# Patient Record
Sex: MALE | Race: WHITE | NOT HISPANIC OR LATINO | URBAN - METROPOLITAN AREA
[De-identification: names, ages, dates, MRNs, and addresses within clinical notes are randomized per-mention and may not be internally consistent; named-entity substitution may affect disease eponyms.]

---

## 2020-11-11 ENCOUNTER — OUTPATIENT (OUTPATIENT)
Dept: OUTPATIENT SERVICES | Facility: HOSPITAL | Age: 65
LOS: 1 days | End: 2020-11-11

## 2023-08-02 PROBLEM — Z00.00 ENCOUNTER FOR PREVENTIVE HEALTH EXAMINATION: Status: ACTIVE | Noted: 2023-08-02

## 2023-08-03 ENCOUNTER — APPOINTMENT (OUTPATIENT)
Dept: INFECTIOUS DISEASE | Facility: CLINIC | Age: 68
End: 2023-08-03
Payer: MEDICARE

## 2023-08-03 VITALS
DIASTOLIC BLOOD PRESSURE: 82 MMHG | OXYGEN SATURATION: 93 % | HEIGHT: 71 IN | SYSTOLIC BLOOD PRESSURE: 160 MMHG | WEIGHT: 226 LBS | BODY MASS INDEX: 31.64 KG/M2 | TEMPERATURE: 97.9 F | HEART RATE: 47 BPM

## 2023-08-03 DIAGNOSIS — Z87.19 PERSONAL HISTORY OF OTHER DISEASES OF THE DIGESTIVE SYSTEM: ICD-10-CM

## 2023-08-03 DIAGNOSIS — Z77.22 CONTACT WITH AND (SUSPECTED) EXPOSURE TO ENVIRONMENTAL TOBACCO SMOKE (ACUTE) (CHRONIC): ICD-10-CM

## 2023-08-03 DIAGNOSIS — Z11.4 ENCOUNTER FOR SCREENING FOR HUMAN IMMUNODEFICIENCY VIRUS [HIV]: ICD-10-CM

## 2023-08-03 DIAGNOSIS — Z11.3 ENCOUNTER FOR SCREENING FOR INFECTIONS WITH A PREDOMINANTLY SEXUAL MODE OF TRANSMISSION: ICD-10-CM

## 2023-08-03 DIAGNOSIS — I10 ESSENTIAL (PRIMARY) HYPERTENSION: ICD-10-CM

## 2023-08-03 DIAGNOSIS — Z78.9 OTHER SPECIFIED HEALTH STATUS: ICD-10-CM

## 2023-08-03 DIAGNOSIS — S30.861A INSECT BITE (NONVENOMOUS) OF ABDOMINAL WALL, INITIAL ENCOUNTER: ICD-10-CM

## 2023-08-03 DIAGNOSIS — L50.9 URTICARIA, UNSPECIFIED: ICD-10-CM
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
## 2023-08-03 PROCEDURE — 99204 OFFICE O/P NEW MOD 45 MIN: CPT

## 2023-08-03 NOTE — PHYSICAL EXAM
[General Appearance - Alert] : alert [General Appearance - In No Acute Distress] : in no acute distress [Sclera] : the sclera and conjunctiva were normal [PERRL With Normal Accommodation] : pupils were equal in size, round, reactive to light [Extraocular Movements] : extraocular movements were intact [Outer Ear] : the ears and nose were normal in appearance [Oropharynx] : the oropharynx was normal with no thrush [Neck Appearance] : the appearance of the neck was normal [Neck Cervical Mass (___cm)] : no neck mass was observed [Jugular Venous Distention Increased] : there was no jugular-venous distention [Thyroid Diffuse Enlargement] : the thyroid was not enlarged [Auscultation Breath Sounds / Voice Sounds] : lungs were clear to auscultation bilaterally [Heart Rate And Rhythm] : heart rate was normal and rhythm regular [Heart Sounds] : normal S1 and S2 [Heart Sounds Gallop] : no gallops [Murmurs] : no murmurs [Heart Sounds Pericardial Friction Rub] : no pericardial rub [Full Pulse] : the pedal pulses are present [Edema] : there was no peripheral edema [Bowel Sounds] : normal bowel sounds [Abdomen Soft] : soft [Abdomen Tenderness] : non-tender [Abdomen Mass (___ Cm)] : no abdominal mass palpated [Costovertebral Angle Tenderness] : no CVA tenderness [No Palpable Adenopathy] : no palpable adenopathy [Cervical Lymph Nodes Enlarged Posterior Bilaterally] : posterior cervical [Cervical Lymph Nodes Enlarged Anterior Bilaterally] : anterior cervical [Supraclavicular Lymph Nodes Enlarged Bilaterally] : supraclavicular [Axillary Lymph Nodes Enlarged Bilaterally] : axillary [Femoral Lymph Nodes Enlarged Bilaterally] : femoral [Inguinal Lymph Nodes Enlarged Bilaterally] : inguinal [Musculoskeletal - Swelling] : no joint swelling [Nail Clubbing] : no clubbing  or cyanosis of the fingernails [Motor Tone] : muscle strength and tone were normal [Skin Color & Pigmentation] : normal skin color and pigmentation [] : no rash [FreeTextEntry1] : He is diaphoretic on the arms [Cranial Nerves] : cranial nerves 2-12 were intact [Sensation] : the sensory exam was normal to light touch and pinprick [No Focal Deficits] : no focal deficits [Oriented To Time, Place, And Person] : oriented to person, place, and time [Affect] : the affect was normal

## 2023-08-03 NOTE — HISTORY OF PRESENT ILLNESS
[FreeTextEntry1] : 67 HTN, HLD He has been in generally good health, in February 2023 he was vacationing in [place], was fishing, and had a fishhook get stuck in the left lateral ankle.  It was cut off by a local doctor, and of having foreign body, needing to have surgical removal of the foreign body of all parts.  He was given 3 days of Augmentin for this, then developed hives, which resolved with Benadryl after some time.  He went to an allergist for work-up was negative.  Since that February occasion, he has been having intermittent development of hives most noticeably in his underarms, groins, also occasional with lip swelling.  He has been taking Allegra for the hives has been controlling it.  Now he realized that it is not lasting more than 24 hours.  His wife is with him, and reports that they have tried changing detergents every 2 weeks without any noticeable improvement.  They also done the same thing for his body soaps, also to basis.  --- no weight  no dairhea no new foods no pets    partner for 40 something years  no prior HIV screen  no syphilis screening.   occ tick bites.  Most recent 1 was found in the abdomen about 2 weeks ago.  Patient works as a former .

## 2023-08-03 NOTE — ASSESSMENT
[FreeTextEntry1] : This 67-year-old man with several months of hives intermittently, work-up has been negative at several allergist.  He was told that he has undiagnosed allergy.  He did have travel history, but no real GI symptoms. We will do a look for parasites, we will also screen for HIV and sexually transmitted infections.  We will also screen for tickborne illnesses as shown in the lab order section.  We will also screen for tuberculosis.

## 2023-08-03 NOTE — REVIEW OF SYSTEMS
[As Noted in HPI] : as noted in HPI [Skin Lesions] : skin lesion [Negative] : Heme/Lymph [de-identified] : Hives.

## 2023-08-04 ENCOUNTER — LABORATORY RESULT (OUTPATIENT)
Age: 68
End: 2023-08-04

## 2023-08-08 PROBLEM — Z87.19 HISTORY OF APPENDICITIS: Status: RESOLVED | Noted: 2023-08-03 | Resolved: 2023-08-08

## 2023-08-08 PROBLEM — Z77.22 SECONDHAND SMOKE EXPOSURE: Status: ACTIVE | Noted: 2023-08-08

## 2023-08-08 PROBLEM — Z78.9 CONSUMES ALCOHOL: Status: ACTIVE | Noted: 2023-08-03

## 2023-08-08 RX ORDER — AMLODIPINE BESYLATE 5 MG/1
5 TABLET ORAL DAILY
Refills: 0 | Status: ACTIVE | COMMUNITY

## 2023-08-08 RX ORDER — ROSUVASTATIN CALCIUM 5 MG/1
5 TABLET, FILM COATED ORAL DAILY
Refills: 0 | Status: ACTIVE | COMMUNITY

## 2023-08-21 LAB
A PHAGO GROEL BLD QL NAA+NON-PROBE: NEGATIVE
ALBUMIN SERPL ELPH-MCNC: 4.3 G/DL
ALP BLD-CCNC: 41 U/L
ALT SERPL-CCNC: 26 U/L
ANION GAP SERPL CALC-SCNC: 13 MMOL/L
AST SERPL-CCNC: 19 U/L
BABESIA SPECIES PCR: NOT DETECTED
BILIRUB SERPL-MCNC: 0.3 MG/DL
BUN SERPL-MCNC: 16 MG/DL
C TRACH RRNA SPEC QL NAA+PROBE: NOT DETECTED
CALCIUM SERPL-MCNC: 9.2 MG/DL
CHLORIDE SERPL-SCNC: 102 MMOL/L
CO2 SERPL-SCNC: 24 MMOL/L
CREAT SERPL-MCNC: 1.05 MG/DL
CRP SERPL-MCNC: 4 MG/L
DEPRECATED O AND P PREP STL: NORMAL
E CANIS+EWIN GROEL BLD QL NAA+NON-PROBE: NEGATIVE
E CHAFF GROEL BLD QL NAA+NON-PROBE: NEGATIVE
E MURIS EAUCL GROEL BLD QL NAA+NON-PRB: NEGATIVE
EGFR: 77 ML/MIN/1.73M2
ERYTHROCYTE [SEDIMENTATION RATE] IN BLOOD BY WESTERGREN METHOD: 6 MM/HR
GLUCOSE SERPL-MCNC: 102 MG/DL
HIV1+2 AB SPEC QL IA.RAPID: NONREACTIVE
M TB IFN-G BLD-IMP: NEGATIVE
N GONORRHOEA RRNA SPEC QL NAA+PROBE: NOT DETECTED
POTASSIUM SERPL-SCNC: 5.2 MMOL/L
PROT SERPL-MCNC: 6.5 G/DL
QUANTIFERON TB PLUS MITOGEN MINUS NIL: 9.24 IU/ML
QUANTIFERON TB PLUS NIL: 0.02 IU/ML
QUANTIFERON TB PLUS TB1 MINUS NIL: 0 IU/ML
QUANTIFERON TB PLUS TB2 MINUS NIL: 0 IU/ML
R RICKETTSI IGG CSF-ACNC: NEGATIVE
R RICKETTSI IGM CSF-ACNC: 0.99 INDEX
SODIUM SERPL-SCNC: 139 MMOL/L
SOURCE AMPLIFICATION: NORMAL
T PALLIDUM AB SER QL IA: NEGATIVE

## 2023-10-16 ENCOUNTER — OFFICE (OUTPATIENT)
Dept: URBAN - METROPOLITAN AREA CLINIC 12 | Facility: CLINIC | Age: 68
Setting detail: OPHTHALMOLOGY
End: 2023-10-16
Payer: MEDICARE

## 2023-10-16 DIAGNOSIS — H25.13: ICD-10-CM

## 2023-10-16 DIAGNOSIS — H52.11: ICD-10-CM

## 2023-10-16 DIAGNOSIS — D35.2: ICD-10-CM

## 2023-10-16 DIAGNOSIS — H43.812: ICD-10-CM

## 2023-10-16 PROCEDURE — 99204 OFFICE O/P NEW MOD 45 MIN: CPT | Performed by: OPHTHALMOLOGY

## 2023-10-16 PROCEDURE — 92250 FUNDUS PHOTOGRAPHY W/I&R: CPT | Performed by: OPHTHALMOLOGY

## 2023-10-16 PROCEDURE — 92083 EXTENDED VISUAL FIELD XM: CPT | Performed by: OPHTHALMOLOGY

## 2023-10-16 ASSESSMENT — KERATOMETRY
METHOD_AUTO_MANUAL: AUTO
OS_AXISANGLE_DEGREES: 090
OD_K1POWER_DIOPTERS: 40.75
OD_K2POWER_DIOPTERS: 41.25
OD_AXISANGLE_DEGREES: 094
OS_K1POWER_DIOPTERS: 43.00
OS_K2POWER_DIOPTERS: 43.00

## 2023-10-16 ASSESSMENT — SPHEQUIV_DERIVED
OD_SPHEQUIV: -1.625
OS_SPHEQUIV: -1.375
OD_SPHEQUIV: -1.625
OS_SPHEQUIV: -1.375
OD_SPHEQUIV: -1.5

## 2023-10-16 ASSESSMENT — AXIALLENGTH_DERIVED
OD_AL: 25.2525
OS_AL: 24.3339
OS_AL: 24.3339
OD_AL: 25.2525
OD_AL: 25.1968

## 2023-10-16 ASSESSMENT — VISUAL ACUITY
OS_BCVA: 20/40
OD_BCVA: 20/20-2

## 2023-10-16 ASSESSMENT — REFRACTION_AUTOREFRACTION
OD_CYLINDER: -0.75
OS_CYLINDER: -0.75
OS_AXIS: 067
OS_SPHERE: -1.00
OD_AXIS: 079
OD_SPHERE: -1.25

## 2023-10-16 ASSESSMENT — TONOMETRY
OD_IOP_MMHG: 12
OS_IOP_MMHG: 13

## 2023-10-16 ASSESSMENT — REFRACTION_MANIFEST
OS_CYLINDER: -0.75
OD_VA1: 20/30-2
OD_ADD: +2.50
OS_AXIS: 065
OD_SPHERE: -1.00
OD_AXIS: 107
OD_VA2: 20/20(J1+)
OS_VA2: 20/20(J1+)
OU_VA: 20/20
OS_VA1: 20/20
OD_VA1: 20/20
OS_SPHERE: -1.00
OD_SPHERE: -1.25
OD_CYLINDER: -0.75
OS_ADD: +2.50
OD_AXIS: 080
OD_CYLINDER: -1.00

## 2023-10-16 ASSESSMENT — REFRACTION_CURRENTRX
OD_CYLINDER: -0.50
OS_OVR_VA: 20/
OD_SPHERE: -0.75
OS_AXIS: 075
OS_SPHERE: -1.50
OD_OVR_VA: 20/
OS_CYLINDER: -0.50
OD_VPRISM_DIRECTION: SV
OS_VPRISM_DIRECTION: SV
OD_AXIS: 077

## 2023-10-16 ASSESSMENT — CONFRONTATIONAL VISUAL FIELD TEST (CVF)
OD_FINDINGS: FULL
OS_FINDINGS: FULL

## 2023-11-16 ENCOUNTER — OFFICE (OUTPATIENT)
Dept: URBAN - METROPOLITAN AREA CLINIC 12 | Facility: CLINIC | Age: 68
Setting detail: OPHTHALMOLOGY
End: 2023-11-16
Payer: MEDICARE

## 2023-11-16 DIAGNOSIS — H25.13: ICD-10-CM

## 2023-11-16 DIAGNOSIS — H25.11: ICD-10-CM

## 2023-11-16 PROCEDURE — 99213 OFFICE O/P EST LOW 20 MIN: CPT | Performed by: OPHTHALMOLOGY

## 2023-11-16 PROCEDURE — 92136 OPHTHALMIC BIOMETRY: CPT | Mod: 26,RT | Performed by: OPHTHALMOLOGY

## 2023-11-16 PROCEDURE — 92136 OPHTHALMIC BIOMETRY: CPT | Mod: TC | Performed by: OPHTHALMOLOGY

## 2023-11-16 ASSESSMENT — REFRACTION_AUTOREFRACTION
OS_CYLINDER: -0.75
OD_CYLINDER: -0.50
OD_SPHERE: -1.75
OS_SPHERE: -1.25
OD_AXIS: 074
OS_AXIS: 080

## 2023-11-16 ASSESSMENT — REFRACTION_CURRENTRX
OS_SPHERE: -1.50
OD_SPHERE: -0.75
OS_VPRISM_DIRECTION: SV
OS_CYLINDER: -0.50
OS_AXIS: 075
OD_VPRISM_DIRECTION: SV
OD_AXIS: 077
OD_CYLINDER: -0.50
OS_OVR_VA: 20/
OD_OVR_VA: 20/

## 2023-11-16 ASSESSMENT — SPHEQUIV_DERIVED
OD_SPHEQUIV: -1.625
OS_SPHEQUIV: -1.375
OD_SPHEQUIV: -1.5
OD_SPHEQUIV: -2
OS_SPHEQUIV: -1.625

## 2023-11-16 ASSESSMENT — REFRACTION_MANIFEST
OU_VA: 20/20
OS_VA2: 20/20(J1+)
OD_CYLINDER: -0.75
OD_SPHERE: -1.25
OD_VA1: 20/20
OS_SPHERE: -1.00
OD_CYLINDER: -1.00
OD_ADD: +2.50
OS_AXIS: 065
OD_SPHERE: -1.00
OD_AXIS: 107
OS_ADD: +2.50
OS_CYLINDER: -0.75
OD_VA1: 20/30-2
OS_VA1: 20/20
OD_AXIS: 080
OD_VA2: 20/20(J1+)

## 2023-11-16 ASSESSMENT — CONFRONTATIONAL VISUAL FIELD TEST (CVF)
OD_FINDINGS: FULL
OS_FINDINGS: FULL

## 2023-11-28 ENCOUNTER — ASC (OUTPATIENT)
Dept: URBAN - METROPOLITAN AREA SURGERY 8 | Facility: SURGERY | Age: 68
Setting detail: OPHTHALMOLOGY
End: 2023-11-28
Payer: MEDICARE

## 2023-11-28 DIAGNOSIS — H52.211: ICD-10-CM

## 2023-11-28 DIAGNOSIS — H25.11: ICD-10-CM

## 2023-11-28 PROCEDURE — 66984 XCAPSL CTRC RMVL W/O ECP: CPT | Mod: 54,RT | Performed by: OPHTHALMOLOGY

## 2023-11-28 PROCEDURE — 68841 INSJ RX ELUT IMPLT LAC CANAL: CPT | Mod: RT | Performed by: OPHTHALMOLOGY

## 2023-11-28 PROCEDURE — ORA ORA: Performed by: OPHTHALMOLOGY

## 2023-11-28 PROCEDURE — FEMTO FEMTOSECOND LASER: Mod: GY | Performed by: OPHTHALMOLOGY

## 2023-11-28 PROCEDURE — A9270 NON-COVERED ITEM OR SERVICE: HCPCS | Mod: GY | Performed by: OPHTHALMOLOGY

## 2023-11-29 ENCOUNTER — OFFICE (OUTPATIENT)
Dept: URBAN - METROPOLITAN AREA CLINIC 12 | Facility: CLINIC | Age: 68
Setting detail: OPHTHALMOLOGY
End: 2023-11-29
Payer: MEDICARE

## 2023-11-29 ENCOUNTER — RX ONLY (RX ONLY)
Age: 68
End: 2023-11-29

## 2023-11-29 DIAGNOSIS — Z96.1: ICD-10-CM

## 2023-11-29 DIAGNOSIS — H25.12: ICD-10-CM

## 2023-11-29 PROCEDURE — 99024 POSTOP FOLLOW-UP VISIT: CPT | Performed by: OPHTHALMOLOGY

## 2023-11-29 ASSESSMENT — CONFRONTATIONAL VISUAL FIELD TEST (CVF)
OD_FINDINGS: FULL
OS_FINDINGS: FULL

## 2023-11-30 PROBLEM — Z96.1 PSEUDOPHAKIA-1 DAY P/O CAT W/ IOL: Status: ACTIVE | Noted: 2023-11-29

## 2023-11-30 ASSESSMENT — REFRACTION_AUTOREFRACTION
OS_SPHERE: -0.75
OS_CYLINDER: -1.50
OD_SPHERE: -0.25
OD_CYLINDER: -0.25
OD_AXIS: 065
OS_AXIS: 076

## 2023-11-30 ASSESSMENT — SPHEQUIV_DERIVED
OS_SPHEQUIV: -1.5
OS_SPHEQUIV: -1.375
OD_SPHEQUIV: -0.375
OD_SPHEQUIV: -1.625
OD_SPHEQUIV: -1.5

## 2023-11-30 ASSESSMENT — REFRACTION_MANIFEST
OD_AXIS: 080
OS_VA1: 20/20
OD_VA1: 20/20
OD_AXIS: 107
OD_SPHERE: -1.25
OD_ADD: +2.50
OD_CYLINDER: -1.00
OS_ADD: +2.50
OD_CYLINDER: -0.75
OU_VA: 20/20
OS_VA2: 20/20(J1+)
OD_VA1: 20/30-2
OS_SPHERE: -1.00
OS_CYLINDER: -0.75
OD_VA2: 20/20(J1+)
OD_SPHERE: -1.00
OS_AXIS: 065

## 2023-11-30 ASSESSMENT — REFRACTION_CURRENTRX
OS_SPHERE: -1.50
OD_CYLINDER: -0.50
OS_VPRISM_DIRECTION: SV
OS_OVR_VA: 20/
OS_CYLINDER: -0.50
OD_VPRISM_DIRECTION: SV
OS_AXIS: 075
OD_OVR_VA: 20/
OD_SPHERE: -0.75
OD_AXIS: 077

## 2025-01-09 ENCOUNTER — OFFICE (OUTPATIENT)
Dept: URBAN - METROPOLITAN AREA CLINIC 12 | Facility: CLINIC | Age: 70
Setting detail: OPHTHALMOLOGY
End: 2025-01-09
Payer: MEDICARE

## 2025-01-09 DIAGNOSIS — H43.812: ICD-10-CM

## 2025-01-09 DIAGNOSIS — Z96.1: ICD-10-CM

## 2025-01-09 DIAGNOSIS — D35.2: ICD-10-CM

## 2025-01-09 DIAGNOSIS — H52.11: ICD-10-CM

## 2025-01-09 DIAGNOSIS — H25.12: ICD-10-CM

## 2025-01-09 PROCEDURE — 92014 COMPRE OPH EXAM EST PT 1/>: CPT | Performed by: OPHTHALMOLOGY

## 2025-01-09 ASSESSMENT — REFRACTION_MANIFEST
OD_AXIS: 045
OD_SPHERE: -1.00
OU_VA: 20/20
OS_ADD: +2.50
OD_SPHERE: -0.25
OS_VA1: 20/20
OS_VA2: 20/20(J1+)
OD_CYLINDER: -0.25
OD_VA2: 20/20(J1+)
OD_AXIS: 107
OS_SPHERE: -0.75
OS_CYLINDER: -1.25
OD_ADD: +2.50
OD_VA1: 20/30-2
OD_CYLINDER: -1.00
OD_VA1: 20/20
OS_AXIS: 074

## 2025-01-09 ASSESSMENT — CONFRONTATIONAL VISUAL FIELD TEST (CVF)
OD_FINDINGS: FULL
OS_FINDINGS: FULL

## 2025-01-09 ASSESSMENT — REFRACTION_AUTOREFRACTION
OS_SPHERE: -0.75
OD_AXIS: 045
OD_SPHERE: -0.25
OD_CYLINDER: -0.25
OS_CYLINDER: -1.25
OS_AXIS: 074

## 2025-01-09 ASSESSMENT — REFRACTION_CURRENTRX
OS_CYLINDER: -0.50
OS_SPHERE: -1.50
OD_VPRISM_DIRECTION: SV
OD_CYLINDER: -0.50
OD_SPHERE: -0.75
OD_OVR_VA: 20/
OS_OVR_VA: 20/
OD_AXIS: 077
OS_VPRISM_DIRECTION: SV
OS_AXIS: 075

## 2025-01-09 ASSESSMENT — KERATOMETRY
OS_K2POWER_DIOPTERS: 43.00
OD_K2POWER_DIOPTERS: 41.25
OS_K1POWER_DIOPTERS: 42.75
METHOD_AUTO_MANUAL: AUTO
OS_AXISANGLE_DEGREES: 035
OD_K1POWER_DIOPTERS: 41.00
OD_AXISANGLE_DEGREES: 098

## 2025-01-09 ASSESSMENT — VISUAL ACUITY
OS_BCVA: 20/25-1
OD_BCVA: 20/60-1

## 2025-01-09 ASSESSMENT — TONOMETRY
OS_IOP_MMHG: 15
OD_IOP_MMHG: 10

## 2025-07-11 ENCOUNTER — OFFICE (OUTPATIENT)
Dept: URBAN - METROPOLITAN AREA CLINIC 12 | Facility: CLINIC | Age: 70
Setting detail: OPHTHALMOLOGY
End: 2025-07-11
Payer: MEDICARE

## 2025-07-11 DIAGNOSIS — H25.12: ICD-10-CM

## 2025-07-11 DIAGNOSIS — H43.812: ICD-10-CM

## 2025-07-11 DIAGNOSIS — D35.2: ICD-10-CM

## 2025-07-11 DIAGNOSIS — Z96.1: ICD-10-CM

## 2025-07-11 DIAGNOSIS — H52.11: ICD-10-CM

## 2025-07-11 PROCEDURE — 99214 OFFICE O/P EST MOD 30 MIN: CPT | Performed by: OPHTHALMOLOGY

## 2025-07-11 ASSESSMENT — REFRACTION_MANIFEST
OD_VA2: 20/20(J1+)
OS_AXIS: 074
OD_AXIS: 107
OS_VA2: 20/20(J1+)
OD_ADD: +2.50
OS_VA1: 20/30-
OD_VA1: 20/30-2
OD_SPHERE: -0.25
OS_CYLINDER: -1.00
OD_SPHERE: -0.25
OS_SPHERE: -0.75
OU_VA: 20/20
OD_VA1: 20/20-2
OS_SPHERE: -1.00
OD_CYLINDER: -0.25
OS_ADD: +2.50
OD_AXIS: 063
OD_AXIS: 045
OD_CYLINDER: -1.00
OD_SPHERE: -1.00
OD_CYLINDER: -0.25
OS_AXIS: 079
OS_CYLINDER: -1.25
OS_VA1: 20/20
OD_VA1: 20/20

## 2025-07-11 ASSESSMENT — REFRACTION_CURRENTRX
OD_OVR_VA: 20/
OS_AXIS: 075
OS_VPRISM_DIRECTION: SV
OD_CYLINDER: -0.50
OD_AXIS: 077
OS_OVR_VA: 20/
OD_VPRISM_DIRECTION: SV
OS_SPHERE: -1.50
OS_CYLINDER: -0.50
OD_SPHERE: -0.75

## 2025-07-11 ASSESSMENT — TONOMETRY
OD_IOP_MMHG: 16
OS_IOP_MMHG: 17

## 2025-07-11 ASSESSMENT — REFRACTION_AUTOREFRACTION
OS_AXIS: 079
OD_CYLINDER: -0.25
OD_AXIS: 063
OS_SPHERE: -1.00
OS_CYLINDER: -1.00
OD_SPHERE: -0.25

## 2025-07-11 ASSESSMENT — KERATOMETRY
OD_K1POWER_DIOPTERS: 41.25
OS_K1POWER_DIOPTERS: 42.75
OS_AXISANGLE_DEGREES: 028
METHOD_AUTO_MANUAL: AUTO
OD_AXISANGLE_DEGREES: 090
OS_K2POWER_DIOPTERS: 43.00
OD_K2POWER_DIOPTERS: 41.25

## 2025-07-11 ASSESSMENT — CONFRONTATIONAL VISUAL FIELD TEST (CVF)
OS_FINDINGS: FULL
OD_FINDINGS: FULL

## 2025-07-11 ASSESSMENT — VISUAL ACUITY
OD_BCVA: 20/50-
OS_BCVA: 20/25+2

## 2025-07-17 ENCOUNTER — OFFICE (OUTPATIENT)
Dept: URBAN - METROPOLITAN AREA CLINIC 12 | Facility: CLINIC | Age: 70
Setting detail: OPHTHALMOLOGY
End: 2025-07-17
Payer: MEDICARE

## 2025-07-17 DIAGNOSIS — D35.2: ICD-10-CM

## 2025-07-17 DIAGNOSIS — H25.12: ICD-10-CM

## 2025-07-17 DIAGNOSIS — Z96.1: ICD-10-CM

## 2025-07-17 DIAGNOSIS — H43.812: ICD-10-CM

## 2025-07-17 PROCEDURE — 99213 OFFICE O/P EST LOW 20 MIN: CPT | Performed by: OPHTHALMOLOGY

## 2025-07-17 PROCEDURE — 92136 OPHTHALMIC BIOMETRY: CPT | Mod: 26,LT | Performed by: OPHTHALMOLOGY

## 2025-07-17 ASSESSMENT — REFRACTION_MANIFEST
OD_ADD: +2.50
OD_SPHERE: -0.25
OS_CYLINDER: -1.00
OS_VA2: 20/20(J1+)
OS_VA1: 20/30-
OD_AXIS: 063
OD_VA1: 20/20
OD_VA2: 20/20(J1+)
OU_VA: 20/20
OD_SPHERE: -1.00
OD_VA1: 20/20-2
OS_AXIS: 074
OS_SPHERE: -1.00
OS_AXIS: 079
OS_VA1: 20/20
OD_AXIS: 107
OS_ADD: +2.50
OD_SPHERE: -0.25
OD_CYLINDER: -1.00
OS_CYLINDER: -1.25
OD_AXIS: 045
OD_CYLINDER: -0.25
OD_VA1: 20/30-2
OS_SPHERE: -0.75
OD_CYLINDER: -0.25

## 2025-07-17 ASSESSMENT — REFRACTION_AUTOREFRACTION
OS_SPHERE: -1.25
OS_AXIS: 077
OD_AXIS: 094
OD_CYLINDER: -0.25
OS_CYLINDER: -1.00
OD_SPHERE: -0.50

## 2025-07-17 ASSESSMENT — KERATOMETRY
OD_AXISANGLE_DEGREES: 109
OS_K1POWER_DIOPTERS: 42.75
OD_K1POWER_DIOPTERS: 41.00
OS_K2POWER_DIOPTERS: 43.00
OS_AXISANGLE_DEGREES: 063
METHOD_AUTO_MANUAL: AUTO
OD_K2POWER_DIOPTERS: 41.25

## 2025-07-17 ASSESSMENT — REFRACTION_CURRENTRX
OD_OVR_VA: 20/
OS_SPHERE: -1.50
OD_SPHERE: -0.75
OS_VPRISM_DIRECTION: SV
OD_VPRISM_DIRECTION: SV
OS_OVR_VA: 20/
OS_CYLINDER: -0.50
OD_AXIS: 077
OS_AXIS: 075
OD_CYLINDER: -0.50

## 2025-07-17 ASSESSMENT — VISUAL ACUITY
OD_BCVA: 20/125
OS_BCVA: 20/30+2

## 2025-07-17 ASSESSMENT — TONOMETRY
OD_IOP_MMHG: 13
OS_IOP_MMHG: 15

## 2025-07-17 ASSESSMENT — CONFRONTATIONAL VISUAL FIELD TEST (CVF)
OS_FINDINGS: FULL
OD_FINDINGS: FULL

## 2025-07-29 ENCOUNTER — ASC (OUTPATIENT)
Dept: URBAN - METROPOLITAN AREA SURGERY 8 | Facility: SURGERY | Age: 70
Setting detail: OPHTHALMOLOGY
End: 2025-07-29
Payer: MEDICARE

## 2025-07-29 DIAGNOSIS — H52.222: ICD-10-CM

## 2025-07-29 DIAGNOSIS — H25.12: ICD-10-CM

## 2025-07-29 PROCEDURE — 66984 XCAPSL CTRC RMVL W/O ECP: CPT | Mod: 54,LT | Performed by: OPHTHALMOLOGY

## 2025-07-29 PROCEDURE — FEMTO PRECISION LASER CATARACT SURGERY: Mod: GY | Performed by: OPHTHALMOLOGY

## 2025-07-29 PROCEDURE — S9986 NOT MEDICALLY NECESSARY SVC: HCPCS | Mod: GX,GY | Performed by: OPHTHALMOLOGY

## 2025-07-29 PROCEDURE — 68841 INSJ RX ELUT IMPLT LAC CANAL: CPT | Mod: LT | Performed by: OPHTHALMOLOGY

## 2025-07-30 ENCOUNTER — RX ONLY (RX ONLY)
Age: 70
End: 2025-07-30

## 2025-07-30 ENCOUNTER — OFFICE (OUTPATIENT)
Dept: URBAN - METROPOLITAN AREA CLINIC 12 | Facility: CLINIC | Age: 70
Setting detail: OPHTHALMOLOGY
End: 2025-07-30
Payer: MEDICARE

## 2025-07-30 DIAGNOSIS — Z96.1: ICD-10-CM

## 2025-07-30 PROCEDURE — 99024 POSTOP FOLLOW-UP VISIT: CPT | Performed by: OPTOMETRIST

## 2025-07-30 ASSESSMENT — REFRACTION_MANIFEST
OD_SPHERE: -0.25
OD_CYLINDER: -0.25
OS_VA1: 20/30-
OD_VA1: 20/30-2
OD_VA2: 20/20(J1+)
OD_AXIS: 107
OS_SPHERE: -1.00
OD_VA1: 20/20-2
OD_SPHERE: -0.25
OS_VA2: 20/20(J1+)
OD_AXIS: 045
OD_VA1: 20/20
OD_SPHERE: -1.00
OU_VA: 20/20
OD_ADD: +2.50
OS_ADD: +2.50
OS_AXIS: 079
OS_CYLINDER: -1.25
OD_CYLINDER: -1.00
OD_AXIS: 063
OS_AXIS: 074
OD_CYLINDER: -0.25
OS_SPHERE: -0.75
OS_VA1: 20/20
OS_CYLINDER: -1.00

## 2025-07-30 ASSESSMENT — REFRACTION_CURRENTRX
OD_CYLINDER: -0.50
OD_VPRISM_DIRECTION: SV
OS_AXIS: 075
OD_SPHERE: -0.75
OD_AXIS: 077
OS_SPHERE: -1.50
OS_VPRISM_DIRECTION: SV
OS_CYLINDER: -0.50
OD_OVR_VA: 20/
OS_OVR_VA: 20/

## 2025-07-30 ASSESSMENT — CONFRONTATIONAL VISUAL FIELD TEST (CVF)
OD_FINDINGS: FULL
OS_FINDINGS: FULL

## 2025-07-30 ASSESSMENT — KERATOMETRY
OD_K1POWER_DIOPTERS: 41.00
OS_K1POWER_DIOPTERS: 42.50
OS_AXISANGLE_DEGREES: 079
OD_AXISANGLE_DEGREES: 092
METHOD_AUTO_MANUAL: AUTO
OD_K2POWER_DIOPTERS: 41.50
OS_K2POWER_DIOPTERS: 43.00

## 2025-07-30 ASSESSMENT — TONOMETRY: OD_IOP_MMHG: 10

## 2025-07-30 ASSESSMENT — REFRACTION_AUTOREFRACTION
OD_AXIS: 000
OD_SPHERE: -0.50
OD_CYLINDER: SPHERE
OS_SPHERE: -1.50
OS_CYLINDER: -0.25
OS_AXIS: 067

## 2025-07-30 ASSESSMENT — VISUAL ACUITY
OD_BCVA: 20/50-1
OS_BCVA: 20/25-1